# Patient Record
Sex: MALE | Race: WHITE
[De-identification: names, ages, dates, MRNs, and addresses within clinical notes are randomized per-mention and may not be internally consistent; named-entity substitution may affect disease eponyms.]

---

## 2023-01-11 ENCOUNTER — HOSPITAL ENCOUNTER (EMERGENCY)
Dept: HOSPITAL 33 - ED | Age: 68
Discharge: HOME | End: 2023-01-11
Payer: MEDICARE

## 2023-01-11 VITALS — OXYGEN SATURATION: 98 %

## 2023-01-11 VITALS — HEART RATE: 88 BPM | DIASTOLIC BLOOD PRESSURE: 87 MMHG | SYSTOLIC BLOOD PRESSURE: 145 MMHG

## 2023-01-11 DIAGNOSIS — I83.028: ICD-10-CM

## 2023-01-11 DIAGNOSIS — L97.829: ICD-10-CM

## 2023-01-11 DIAGNOSIS — L97.819: ICD-10-CM

## 2023-01-11 DIAGNOSIS — L89.309: ICD-10-CM

## 2023-01-11 DIAGNOSIS — Z79.4: ICD-10-CM

## 2023-01-11 DIAGNOSIS — Z79.899: ICD-10-CM

## 2023-01-11 DIAGNOSIS — I83.018: Primary | ICD-10-CM

## 2023-01-11 LAB
ALBUMIN SERPL-MCNC: 3.6 G/DL (ref 3.5–5)
ALP SERPL-CCNC: 110 U/L (ref 38–126)
ALT SERPL-CCNC: 22 U/L (ref 0–50)
ANION GAP SERPL CALC-SCNC: 13.1 MEQ/L (ref 5–15)
AST SERPL QL: 29 U/L (ref 17–59)
BASOPHILS # BLD AUTO: 0.03 X10^3/UL (ref 0–0.4)
BILIRUB BLD-MCNC: 0.3 MG/DL (ref 0.2–1.3)
BUN SERPL-MCNC: 60 MG/DL (ref 9–20)
CALCIUM SPEC-MCNC: 8 MG/DL (ref 8.4–10.2)
CHLORIDE SERPL-SCNC: 102 MMOL/L (ref 98–107)
CO2 SERPL-SCNC: 27 MMOL/L (ref 22–30)
CREAT SERPL-MCNC: 4.32 MG/DL (ref 0.66–1.25)
EOSINOPHIL # BLD AUTO: 0.24 X10^3/UL (ref 0–0.5)
GFR SERPLBLD BASED ON 1.73 SQ M-ARVRAT: 14.7 ML/MIN
GLUCOSE SERPL-MCNC: 129 MG/DL (ref 74–106)
HCT VFR BLD AUTO: 35.8 % (ref 42–50)
HGB BLD-MCNC: 10.7 G/DL (ref 12.5–18)
LYMPHOCYTES # SPEC AUTO: 1.49 X10^3/UL (ref 1–4.6)
MCH RBC QN AUTO: 30.8 PG (ref 26–32)
MCHC RBC AUTO-ENTMCNC: 29.9 G/DL (ref 32–36)
MONOCYTES # BLD AUTO: 0.61 X10^3/UL (ref 0–1.3)
PLATELET # BLD AUTO: 184 X10^3/UL (ref 150–450)
POTASSIUM SERPLBLD-SCNC: 4.3 MMOL/L (ref 3.5–5.1)
PROT SERPL-MCNC: 7.2 G/DL (ref 6.3–8.2)
RBC # BLD AUTO: 3.47 X10^6/UL (ref 4.1–5.6)
SODIUM SERPL-SCNC: 138 MMOL/L (ref 137–145)
WBC # BLD AUTO: 7.7 X10^3/UL (ref 4–10.5)

## 2023-01-11 PROCEDURE — 99283 EMERGENCY DEPT VISIT LOW MDM: CPT

## 2023-01-11 PROCEDURE — 36415 COLL VENOUS BLD VENIPUNCTURE: CPT

## 2023-01-11 PROCEDURE — 80053 COMPREHEN METABOLIC PANEL: CPT

## 2023-01-11 PROCEDURE — 85025 COMPLETE CBC W/AUTO DIFF WBC: CPT

## 2023-01-11 NOTE — ERPHSYRPT
- History of Present Illness


Time Seen by Provider: 01/11/23 11:13


Source: patient


Exam Limitations: no limitations


Physician History: 





This is a 67-year-old morbidly obese  American gentleman who is on daily 

peritoneal dialysis and has bilateral lower extremity lymphedema and chronic 

venous stasis ulcer disease.  The ulcerations are new in the last 1 to 2 weeks. 

Patient's nephrologist is Dr. Escobar.  Patient had not had any fevers.  He 

denies chest pain.  He denies abdominal pain.  He has a home health care nurse 

but has not yet seen a wound care specialist.  History was obtained from the 

patient and additional history was obtained from the patient's spouse.


Quality: painful


Severity: mild (To moderate)


Location: extremities (Bilateral lower extremities), other (Right buttock)


Possible Causes: other (Chronic venous stasis disease and lymphedema as well as 

pressure ulceration)


Associated Symptoms: other (Bilateral lower extremity lymphedema)


Allergies/Adverse Reactions: 








fentanyl Allergy (Verified 01/11/23 11:36)


   


influenza virus vacc tvs 2013-14(18yr,up) cell celine [From Flucelvax] Allergy 

(Verified 01/11/23 11:32)


   


influenza virus vaccine qs 5027-1343 (36 mos, up) [From Fluarix Quad] Allergy (

Verified 01/11/23 11:32)


   


influenza virus vaccine ts 2711-3865 (36 mos,up) [From Fluarix] Allergy (

Verified 01/11/23 11:32)


   


influenza virus vaccine tv 2013-14(18-49 yrs),rcmb [From Flublok] Allergy 

(Verified 01/11/23 11:32)


   


isradipine [From DynaCirc] Allergy (Verified 01/11/23 11:36)


   


paroxetine [From Paxil] Allergy (Verified 01/11/23 11:36)


   


pneumococcal vaccine Allergy (Verified 01/11/23 11:32)


   





Home Medications: 








Allopurinol 100 mg*** [Zyloprim 100 mg***] 100 mg PO DAILY 01/11/23 [History]


Dapagliflozin Propanediol [Farxiga] 10 mg PO DAILY 01/11/23 [History]


Ergocalciferol (Vitamin D2) [Vitamin D2] 1,250 mcg PO WEEKLY 01/11/23 [History]


Gabapentin 800 mg PO TID 01/11/23 [History]


Insulin Aspart** [NovoLOG Insulin**] 0 unit SQ UD 01/11/23 [History]


Labetalol HCl 100 mg*** [Trandate 100 MG***] 200 mg PO TID 01/11/23 [History]


Levothyroxine Sodium [Synthroid] 125 mcg PO QAM 01/11/23 [History]


Lorazepam 1 mg*** [Ativan 1 MG***] 1 mg PO TID 01/11/23 [History]


Pantoprazole 20 mg*** [Protonix 20MG Tablet***] 20 mg PO DAILY 01/11/23 

[History]


Potassium Chloride 20 meq PO DAILY 01/11/23 [History]


Pravastatin Sodium 80 mg PO HS 01/11/23 [History]


Quetiapine Fumarate 25 mg*** [Seroquel 25 MG***] 25 mg PO QID 01/11/23 [History]


Tamsulosin HCl 0.4 mg*** [Flomax 0.4 MG***] 0.4 mg PO DAILY 01/11/23 [History]


Tizanidine HCl 4 mg** [Zanaflex 4 MG**] 4 mg PO TID 01/11/23 [History]


Torsemide 100 mg PO TID 01/11/23 [History]


cloNIDine HCL [Clonidine HCl] 0.3 mg PO BID 01/11/23 [History]








Travel Risk





- International Travel


Have you traveled outside of the country in past 3 weeks: No





- Coronavirus Screening


Are you exhibiting any of the following symptoms?: No


Close contact with a COVID-19 positive Pt in past 14-21 Days: No





- Review of Systems


Constitutional: No Symptoms


Eyes: No Symptoms


Ears, Nose, & Throat: No Symptoms


Respiratory: No Symptoms


Cardiac: No Symptoms


Abdominal/Gastrointestinal: No Symptoms


Genitourinary Symptoms: No Symptoms


Musculoskeletal: No Symptoms


Skin: Other (Wound ulcerations right buttock and bilateral lower extremities)


Neurological: No Symptoms


Psychological: No Symptoms


Endocrine: No Symptoms


Hematologic/Lymphatic: No Symptoms


Immunological/Allergic: No Symptoms


All Other Systems: Reviewed and Negative





- Past Medical History


Pertinent Past Medical History: Yes





- Past Surgical History


Past Surgical History: Yes





- Nursing Vital Signs


Nursing Vital Signs: 


                               Initial Vital Signs











Temperature  97.0 F   01/11/23 11:07


 


Pulse Rate  71   01/11/23 11:07


 


Blood Pressure  185/82   01/11/23 11:07


 


O2 Sat by Pulse Oximetry  98   01/11/23 11:07








                                   Pain Scale











Pain Intensity                 10

















- Physical Exam


General Appearance: no apparent distress, alert, obese


Eye Exam: PERRL/EOMI, eyes nml inspection


Ears, Nose, Throat Exam: normal ENT inspection, moist mucous membranes


Neck Exam: normal inspection, non-tender, supple, full range of motion


Respiratory Exam: normal breath sounds, lungs clear, airway intact, No chest 

tenderness, No respiratory distress


Cardiovascular Exam: regular rate/rhythm, normal heart sounds, normal peripheral

 pulses


Gastrointestinal/Abdomen Exam: soft, normal bowel sounds, No tenderness


Rectal Exam: other (Buttock pressure ulcer.  Clean with granulation tissue 

present.)


Extremity Exam: pedal edema, swelling, other (Patient has chronic venous stasis 

disease with ulcerations that are new within the last 1 to 2 weeks.  The 

ulceration wounds are open clean and granulating.  Patient has bilateral lower 

extremity lymphedema.)


Neurologic Exam: alert, oriented x 3, cooperative


Skin Exam: other (See extremity exam description)


Lymphatic Exam: No adenopathy


**SpO2 Interpretation**: normal


O2 Delivery: Room Air


Ordered Tests: 


                               Active Orders 24 hr











 Category Date Time Status


 


 CBC W DIFF Stat Lab  01/11/23 13:15 Completed


 


 CMP Stat Lab  01/11/23 13:15 Completed








Medication Summary














Discontinued Medications














Generic Name Dose Route Start Last Admin





  Trade Name Corbin  PRN Reason Stop Dose Admin


 


Levofloxacin  500 mg  01/11/23 12:35  01/11/23 12:39





  Levofloxacin 500 Mg Tablet  PO  01/11/23 12:36  500 mg





  STAT ONE   Administration


 


Levofloxacin  Confirm  01/11/23 12:39 





  Levofloxacin 500 Mg Tablet  Administered  01/11/23 12:40 





  Dose  





  500 mg  





  .ROUTE  





  .STK-MED ONE  











Lab/Rad Data: 


                           Laboratory Result Diagrams





                                 01/11/23 13:15 





                                 01/11/23 13:15 





                               Laboratory Results











  01/11/23 01/11/23 Range/Units





  13:15 13:15 


 


WBC   7.7  (4.0-10.5)  x10^3/uL


 


RBC   3.47 L  (4.1-5.6)  x10^6/uL


 


Hgb   10.7 L  (12.5-18.0)  g/dL


 


Hct   35.8 L  (42-50)  %


 


MCV   103.2 H  ()  fL


 


MCH   30.8  (26-32)  pg


 


MCHC   29.9 L  (32-36)  g/dL


 


RDW   13.9  (11.5-14.0)  %


 


Plt Count   184  (150-450)  x10^3/uL


 


MPV   9.4  (7.5-11.0)  fL


 


Gran %   68.9 H  (36.0-66.0)  %


 


Immature Gran % (Auto)   0.3  (0.00-0.4)  %


 


Nucleat RBC Rel Count   0.0  (0.00-0.1)  %


 


Eos # (Auto)   0.24  (0-0.5)  x10^3/uL


 


Immature Gran # (Auto)   0.02  (0.00-0.03)  x10^3u/L


 


Absolute Lymphs (auto)   1.49  (1.0-4.6)  x10^3/uL


 


Absolute Monos (auto)   0.61  (0.0-1.3)  x10^3/uL


 


Absolute Nucleated RBC   0.00  (0.00-0.01)  x10^3u/L


 


Lymphocytes %   19.4 L  (24.0-44.0)  %


 


Monocytes %   7.9  (0.0-12.0)  %


 


Eosinophils %   3.1  (0.00-5.0)  %


 


Basophils %   0.4  (0.0-0.4)  %


 


Absolute Granulocytes   5.30  (1.4-6.9)  x10^3/uL


 


Basophils #   0.03  (0-0.4)  x10^3/uL


 


Sodium  138   (137-145)  mmol/L


 


Potassium  4.3   (3.5-5.1)  mmol/L


 


Chloride  102   ()  mmol/L


 


Carbon Dioxide  27   (22-30)  mmol/L


 


Anion Gap  13.1   (5-15)  MEQ/L


 


BUN  60 H   (9-20)  mg/dL


 


Creatinine  4.32 H   (0.66-1.25)  mg/dL


 


Estimated GFR  14.7   ML/MIN


 


Glucose  129 H   ()  mg/dL


 


Calcium  8.0 L   (8.4-10.2)  mg/dL


 


Total Bilirubin  0.30   (0.2-1.3)  mg/dL


 


AST  29   (17-59)  U/L


 


ALT  22   (0-50)  U/L


 


Alkaline Phosphatase  110   ()  U/L


 


Serum Total Protein  7.2   (6.3-8.2)  g/dL


 


Albumin  3.6   (3.5-5.0)  g/dL














- Progress


Progress: improved


Progress Note: 





01/11/23 12:38


I spoke with Dr. Escobar, the patient's nephrologist.  He gave us the order to 

draw a CBC and a CMP today rather than tomorrow to save the patient a trip for 

blood draw.  I updated his nephrologist on the patient's condition and concern 

about his visit today in the emergency department which is ulcerations on his 

bilateral lower legs.  And the chronic swelling of his bilateral lower 

extremities.  Dr. Escobar stated that he does not need to see the patient earlier

 than next week.  Therefore, we will provide the patient with antibiotics and 

make an effort to arrange wound care evaluation at our wound care clinic on 1/12 /2023.


Discussed with Dr.: Other (Dr. Escobar)


Counseled pt/family regarding: lab results, diagnosis, need for follow-up





- Departure


Departure Disposition: Home


Clinical Impression: 


 Venous stasis ulcers of both lower extremities, Decubitus ulcer of right 

buttock





Condition: Stable


Critical Care Time: No


Referrals: 


BERTRAND HARLEY [Primary Care Provider] - Follow up/PCP as directed


Additional Instructions: 


Take all your medication as prescribed.  Continue peritoneal dialysis as you 

have been.  Follow-up with the wound care center clinic at scheduled date and 

appointment time.  Keep your appointment with your nephrologist for next week.


Prescriptions: 


Levofloxacin*** [Levaquin 500 MG Tablet***] 500 mg PO UD #3 tablet